# Patient Record
Sex: MALE | Race: BLACK OR AFRICAN AMERICAN | HISPANIC OR LATINO | URBAN - METROPOLITAN AREA
[De-identification: names, ages, dates, MRNs, and addresses within clinical notes are randomized per-mention and may not be internally consistent; named-entity substitution may affect disease eponyms.]

---
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
## 2023-07-11 ENCOUNTER — EMERGENCY (EMERGENCY)
Facility: HOSPITAL | Age: 18
LOS: 1 days | Discharge: HOME | End: 2023-07-11
Admitting: STUDENT IN AN ORGANIZED HEALTH CARE EDUCATION/TRAINING PROGRAM
Payer: MEDICAID

## 2023-07-11 VITALS
DIASTOLIC BLOOD PRESSURE: 74 MMHG | HEART RATE: 68 BPM | WEIGHT: 174.17 LBS | HEIGHT: 73 IN | SYSTOLIC BLOOD PRESSURE: 128 MMHG | RESPIRATION RATE: 16 BRPM | TEMPERATURE: 98 F | OXYGEN SATURATION: 100 %
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
## 2023-07-11 DIAGNOSIS — G43.909 MIGRAINE, UNSPECIFIED, NOT INTRACTABLE, WITHOUT STATUS MIGRAINOSUS: ICD-10-CM

## 2023-07-11 DIAGNOSIS — M79.645 PAIN IN LEFT FINGER(S): ICD-10-CM
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
## 2023-07-11 DIAGNOSIS — S62.623A DISPLACED FRACTURE OF MIDDLE PHALANX OF LEFT MIDDLE FINGER, INITIAL ENCOUNTER FOR CLOSED FRACTURE: ICD-10-CM

## 2023-07-11 PROCEDURE — 99283 EMERGENCY DEPT VISIT LOW MDM: CPT | Mod: 25

## 2023-07-11 PROCEDURE — 73140 X-RAY EXAM OF FINGER(S): CPT | Mod: 26,LT

## 2023-07-11 PROCEDURE — 99284 EMERGENCY DEPT VISIT MOD MDM: CPT

## 2023-07-11 PROCEDURE — 73140 X-RAY EXAM OF FINGER(S): CPT

## 2023-07-11 NOTE — ED ADULT NURSE NOTE - OBJECTIVE STATEMENT
pt c/o left middle finger pain and swelling after hitting it on someone's leg while playing basketball and bending it backwards sunday night.    +swelling at base of finger.   unable to bend finger.   + sensation distally.   denies other injury

## 2023-07-11 NOTE — ED PROVIDER NOTE - NSFOLLOWUPINSTRUCTIONS_ED_ALL_ED_FT
FINGER FRACTURE - Your results are on the pages to follow. You have a finger fracture. The finger has been put in a splint. Rest, ice the injured area (20 mins on and off 4-5 times a day), and keep it elevated as much as possible. This will all decrease swelling and promote healing. Keep the finger splint on and try not do any heavy lifting with that finger / hand.    FOLLOW UP - Follow up with orthopedics within 1 week.     RETURN PRECAUTIONS - Return to the Emergency Department for persistent, worsening, or new symptoms including numbness/tingling/paresthesias of the arm or hand, chest pain, shortness of breath, or any other serious concerns.

## 2023-07-11 NOTE — ED PROVIDER NOTE - PHYSICAL EXAMINATION
Constitutional : Well appearing, non-toxic, no acute distress. awake, alert, oriented to person, place, time/situation.  Head : head normocephalic, atraumatic  EENMT : eyes clear bilaterally, PERRL, EOMI. airway patent. moist mucous membranes. neck supple.  Cardiac : Extremities warm and well perfused. 2+ radial and DP pulses. cap refill <2 seconds. no LE edema.  Resp : Respirations even and unlabored.   MSK :  LUE - 3rd digit w swelling, tenderness to PIP and distal. skin intact. 2+ radial pulses. sensation intact distally. cap refill <2 seconds. 5/5  strength. remainder of digits ok. flex / extensor mechanism of all fingers intact.  extremities otherwise - range of motion is not limited, no muscle or joint tenderness  Back : No evidence of trauma. No spinal or CVA tenderness.  Neuro : Alert and oriented, CNII-XII grossly intact, no focal deficits, no motor or sensory deficits.  Skin : Skin normal color for race, warm, dry and intact. No evidence of rash.  Psych : Alert and oriented to person, place, time/situation. normal mood and affect. no apparent risk to self or others.

## 2023-07-11 NOTE — ED ADULT NURSE NOTE - NSFALLUNIVINTERV_ED_ALL_ED
Bed/Stretcher in lowest position, wheels locked, appropriate side rails in place/Call bell, personal items and telephone in reach/Instruct patient to call for assistance before getting out of bed/chair/stretcher/Non-slip footwear applied when patient is off stretcher/[place] to call system/Physically safe environment - no spills, clutter or unnecessary equipment/Purposeful proactive rounding/Room/bathroom lighting operational, light cord in reach

## 2023-07-11 NOTE — ED ADULT NURSE NOTE - CAS EDP DISCH TYPE
-dyspnea on 1/1/19--> CXR showed moderated edema.   - Lasix 60 mg IV on 1/1 and 1/2/19 with improvement  -on RA   Home

## 2023-07-11 NOTE — ED PROVIDER NOTE - OBJECTIVE STATEMENT
18 yr old male, denies medical history, presents to the Emergency Department w finger pain. hyperextended L middle finger yesterday while playing basketball. now pain / swelling to middle finger and pain w ranging. no other injuries. no hand or wrist pain.

## 2023-07-11 NOTE — ED PROVIDER NOTE - PATIENT PORTAL LINK FT
You can access the FollowMyHealth Patient Portal offered by [facility] by registering at the following website: [email or web address]. By joining [employer]’s FollowMyHealth portal, you will also be able to view your health information using other applications (apps) compatible with our system.

## 2023-07-11 NOTE — ED PROVIDER NOTE - PROGRESS NOTE DETAILS
xr w fracture to middle phalynx.   placed in splint. ok for dc a hand f/u    All results reviewed with the patient verbally. Discharge plan and return precautions d/w pt who verbalized understanding and agrees with plan. All questions answered. Vitals WNL. Ready for d/c.

## 2023-07-11 NOTE — ED PROVIDER NOTE - CLINICAL SUMMARY MEDICAL DECISION MAKING FREE TEXT BOX
hyperextended L middle finger yesterday while playing basketball. now pain / swelling to middle finger and pain w ranging. no other injuries.   pt well appearing, stable vitals, exam w 3rd digit swelling and tenderness from PIP distally. extensor / flexor mechanisms intact. neurovascularly intact  suspect sprain / strain vs contusion vs fracture  although limited 2/2 pain able to flex and extend digit.   plan - xray  analgesia prn  reassess

## 2023-07-11 NOTE — ED PROVIDER NOTE - CARE PROVIDER_API CALL
Morgan Zavala.  Orthopaedic Surgery  [address] [place] [address], [address]  [place] [address]  Phone: [phone]  Fax: [phone]  Follow Up Time:

## 2023-07-11 NOTE — ED ADULT TRIAGE NOTE - CHIEF COMPLAINT QUOTE
Pt, with no PMH, presents to ER c/o left middle finger pain with swelling and decreased ROM s/p hyperextension while playing basketball last night